# Patient Record
Sex: FEMALE | Race: BLACK OR AFRICAN AMERICAN | NOT HISPANIC OR LATINO | Employment: STUDENT | URBAN - METROPOLITAN AREA
[De-identification: names, ages, dates, MRNs, and addresses within clinical notes are randomized per-mention and may not be internally consistent; named-entity substitution may affect disease eponyms.]

---

## 2020-03-12 ENCOUNTER — HOSPITAL ENCOUNTER (EMERGENCY)
Facility: HOSPITAL | Age: 35
Discharge: HOME | End: 2020-03-12
Attending: EMERGENCY MEDICINE
Payer: MEDICAID

## 2020-03-12 VITALS — TEMPERATURE: 98 F

## 2020-03-12 DIAGNOSIS — F32.A DEPRESSION, UNSPECIFIED DEPRESSION TYPE: Primary | ICD-10-CM

## 2020-03-12 PROCEDURE — 99283 EMERGENCY DEPT VISIT LOW MDM: CPT

## 2020-03-12 RX ORDER — ESCITALOPRAM OXALATE 10 MG/1
10 TABLET ORAL DAILY
Qty: 30 TABLET | Refills: 6 | Status: SHIPPED | OUTPATIENT
Start: 2020-03-12

## 2020-03-12 NOTE — ED PROVIDER NOTES
"Encounter Date: 3/12/2020    SCRIBE #1 NOTE: I, Brodie Bledsoe, am scribing for, and in the presence of, Timoteo Canela MD.       History     Chief Complaint   Patient presents with    Depression     Pt states she is depressed after recent deaths in her family and needs mediciation. Denies and SI.     34 year-old female with history of depression presents to the ED for evaluation. Patient reports she is still grieving after the death of her brother and "needs medication." Reports she was prescribed a low dose of Lexapro but has been out of medication and has not taken it in 1 year. She denies SI/HI/AVH. She would also like a referral to psychiatry as she does not currently have care established due to recently moving to [place]. Patient is a non-smoker and denies alcohol or drug use. She denies other complaints.       The history is provided by the patient.     Review of patient's allergies indicates:  No Known Allergies  Past Medical History:   Diagnosis Date    Depression      Past Surgical History:   Procedure Laterality Date     SECTION       No family history on file.  Social History     Tobacco Use    Smoking status: Not on file   Substance Use Topics    Alcohol use: Not on file    Drug use: Not on file     Review of Systems   Constitutional: Negative for fever.   Psychiatric/Behavioral: Negative for hallucinations, self-injury and suicidal ideas.   All other systems reviewed and are negative.      Physical Exam     Initial Vitals [20 1114]   BP Pulse Resp Temp SpO2   -- -- -- 97.6 °F (36.4 °C) --      MAP       --         Physical Exam    Nursing note and vitals reviewed.  Constitutional: No distress.   HENT:   Head: Atraumatic.   Eyes: EOM are normal.   Neck: Neck supple.   Cardiovascular: Normal rate and regular rhythm.   Pulmonary/Chest: Breath sounds normal.   Abdominal: Soft. There is no tenderness.   Musculoskeletal: Normal range of motion. She exhibits no edema.   Neurological: She is " alert and oriented to person, place, and time.   Skin: Skin is warm and dry.   Psychiatric: Her behavior is normal. Thought content normal.         ED Course   Procedures  Labs Reviewed - No data to display       Imaging Results    None          Medical Decision Making:   ED Management:  34-year-old female who says she has been feeling depressed and still dealing with the loss of 2 of her brothers.  She was on Lexapro in the past, and is requesting a prescription for this.  She denies ETOH or illicit drug abuse.  Patient has no suicidal homicidal ideation.  She is not gravely disabled.  I will start her on Lexapro at 10 mg a day.  Patient has an upcoming appointment with Psychiatry which I have urged her to keep.  She will also return to the ED immediately for any worsening of symptoms especially suicidal thoughts.                                 Clinical Impression:       ICD-10-CM ICD-9-CM   1. Depression, unspecified depression type F32.9 311           Disposition:   Disposition: Discharged  Condition: Stable         I, Dr. Timoteo Canela, personally performed the services described in this documentation. All medical record entries made by the scribe were at my direction and in my presence. I have reviewed the chart and agree that the record reflects my personal performance and is accurate and complete. Timoteo Canela MD.  11:43 AM 03/12/2020                 Timoteo Canela MD  03/12/20 1149

## 2020-04-06 ENCOUNTER — NURSE TRIAGE (OUTPATIENT)
Dept: ADMINISTRATIVE | Facility: CLINIC | Age: 35
End: 2020-04-06

## 2020-04-06 NOTE — TELEPHONE ENCOUNTER
"    Reason for Disposition   Change in shape or appearance of breast    Additional Information   Negative: [1] SEVERE breast pain AND [2] fever > 103 F (39.4 C)   Negative: Patient sounds very sick or weak to the triager   Negative: [1Breast looks infected (spreading redness, feels hot or painful to touch) AND [2] fever   Negative: [1Breast looks infected (spreading redness, feels hot or painful to touch) AND [2] no fever   Negative: [1] Painful rash AND [2] multiple small blisters grouped together (i.e., dermatomal distribution or "band" or "stripe")   Negative: [1] Cuts, burns, or bruises of breasts AND [2] suspicious history for the injury   Negative: Breast lump   Negative: [1] Nipple discharge AND [2] bloody   Negative: Nipple is inverted (i.e., points inward)  (Exception: long-term physical characteristic, present for many years)   Negative: Dry flaking-peeling skin of nipple    Protocols used: BREAST SYMPTOMS-A-AH    Bilateral breast leaking clear fluid. She denies fever or breast pain. Her last period was a few days early. Patient states there is a possibility she could be pregnant and she was encouraged to take a pregnancy test to see if it is positive. Patient verbalized understanding.   "

## 2021-04-16 ENCOUNTER — PATIENT MESSAGE (OUTPATIENT)
Dept: RESEARCH | Facility: HOSPITAL | Age: 36
End: 2021-04-16

## 2021-10-21 ENCOUNTER — HOSPITAL ENCOUNTER (EMERGENCY)
Facility: HOSPITAL | Age: 36
Discharge: HOME | End: 2021-10-21
Attending: EMERGENCY MEDICINE
Payer: MEDICAID

## 2021-10-21 VITALS
DIASTOLIC BLOOD PRESSURE: 93 MMHG | OXYGEN SATURATION: 100 % | HEART RATE: 83 BPM | WEIGHT: 170 LBS | SYSTOLIC BLOOD PRESSURE: 146 MMHG | RESPIRATION RATE: 15 BRPM | HEIGHT: 64 IN | TEMPERATURE: 99 F | BODY MASS INDEX: 29.02 KG/M2

## 2021-10-21 DIAGNOSIS — H53.10 EYE STRAIN, BILATERAL: ICD-10-CM

## 2021-10-21 DIAGNOSIS — G43.809 OTHER MIGRAINE WITHOUT STATUS MIGRAINOSUS, NOT INTRACTABLE: Primary | ICD-10-CM

## 2021-10-21 PROCEDURE — 99281 EMR DPT VST MAYX REQ PHY/QHP: CPT
